# Patient Record
Sex: FEMALE | Race: WHITE | NOT HISPANIC OR LATINO | URBAN - METROPOLITAN AREA
[De-identification: names, ages, dates, MRNs, and addresses within clinical notes are randomized per-mention and may not be internally consistent; named-entity substitution may affect disease eponyms.]

---

## 2022-09-02 ENCOUNTER — EMERGENCY (EMERGENCY)
Facility: HOSPITAL | Age: 52
LOS: 1 days | Discharge: HOME | End: 2022-09-02
Attending: EMERGENCY MEDICINE | Admitting: EMERGENCY MEDICINE

## 2022-09-02 ENCOUNTER — RESULT REVIEW (OUTPATIENT)
Age: 52
End: 2022-09-02

## 2022-09-02 VITALS
DIASTOLIC BLOOD PRESSURE: 66 MMHG | TEMPERATURE: 98 F | SYSTOLIC BLOOD PRESSURE: 112 MMHG | HEART RATE: 94 BPM | RESPIRATION RATE: 18 BRPM | OXYGEN SATURATION: 99 %

## 2022-09-02 VITALS
SYSTOLIC BLOOD PRESSURE: 118 MMHG | OXYGEN SATURATION: 97 % | DIASTOLIC BLOOD PRESSURE: 76 MMHG | HEART RATE: 88 BPM | RESPIRATION RATE: 17 BRPM

## 2022-09-02 LAB
ALBUMIN SERPL ELPH-MCNC: 4.3 G/DL — SIGNIFICANT CHANGE UP (ref 3.3–5)
ALP SERPL-CCNC: 148 U/L — HIGH (ref 40–120)
ALT FLD-CCNC: 29 U/L — SIGNIFICANT CHANGE UP (ref 4–33)
ANION GAP SERPL CALC-SCNC: 21 MMOL/L — HIGH (ref 7–14)
AST SERPL-CCNC: 16 U/L — SIGNIFICANT CHANGE UP (ref 4–32)
BASE EXCESS BLDV CALC-SCNC: -8.7 MMOL/L — LOW (ref -2–3)
BASOPHILS # BLD AUTO: 0.01 K/UL — SIGNIFICANT CHANGE UP (ref 0–0.2)
BASOPHILS NFR BLD AUTO: 0.2 % — SIGNIFICANT CHANGE UP (ref 0–2)
BILIRUB SERPL-MCNC: 0.5 MG/DL — SIGNIFICANT CHANGE UP (ref 0.2–1.2)
BLOOD GAS VENOUS COMPREHENSIVE RESULT: SIGNIFICANT CHANGE UP
BUN SERPL-MCNC: 18 MG/DL — SIGNIFICANT CHANGE UP (ref 7–23)
C3 SERPL-MCNC: 162 MG/DL — SIGNIFICANT CHANGE UP (ref 90–180)
C4 SERPL-MCNC: 32 MG/DL — SIGNIFICANT CHANGE UP (ref 10–40)
CALCIUM SERPL-MCNC: 9.2 MG/DL — SIGNIFICANT CHANGE UP (ref 8.4–10.5)
CHLORIDE BLDV-SCNC: 98 MMOL/L — SIGNIFICANT CHANGE UP (ref 96–108)
CHLORIDE SERPL-SCNC: 94 MMOL/L — LOW (ref 98–107)
CO2 BLDV-SCNC: 19.1 MMOL/L — LOW (ref 22–26)
CO2 SERPL-SCNC: 15 MMOL/L — LOW (ref 22–31)
CREAT SERPL-MCNC: 0.69 MG/DL — SIGNIFICANT CHANGE UP (ref 0.5–1.3)
EGFR: 103 ML/MIN/1.73M2 — SIGNIFICANT CHANGE UP
EOSINOPHIL # BLD AUTO: 0.02 K/UL — SIGNIFICANT CHANGE UP (ref 0–0.5)
EOSINOPHIL NFR BLD AUTO: 0.3 % — SIGNIFICANT CHANGE UP (ref 0–6)
ERYTHROCYTE [SEDIMENTATION RATE] IN BLOOD: 29 MM/HR — HIGH (ref 4–25)
GAS PNL BLDV: 130 MMOL/L — LOW (ref 136–145)
GAS PNL BLDV: SIGNIFICANT CHANGE UP
GLUCOSE BLDV-MCNC: 399 MG/DL — HIGH (ref 70–99)
GLUCOSE SERPL-MCNC: 375 MG/DL — HIGH (ref 70–99)
HCO3 BLDV-SCNC: 18 MMOL/L — LOW (ref 22–29)
HCT VFR BLD CALC: 45.9 % — HIGH (ref 34.5–45)
HCT VFR BLDA CALC: 45 % — SIGNIFICANT CHANGE UP (ref 34.5–46.5)
HGB BLD CALC-MCNC: 14.9 G/DL — SIGNIFICANT CHANGE UP (ref 11.5–15.5)
HGB BLD-MCNC: 14.7 G/DL — SIGNIFICANT CHANGE UP (ref 11.5–15.5)
IANC: 5.01 K/UL — SIGNIFICANT CHANGE UP (ref 1.8–7.4)
IMM GRANULOCYTES NFR BLD AUTO: 0.3 % — SIGNIFICANT CHANGE UP (ref 0–1.5)
LACTATE BLDV-MCNC: 1.5 MMOL/L — SIGNIFICANT CHANGE UP (ref 0.5–2)
LYMPHOCYTES # BLD AUTO: 0.86 K/UL — LOW (ref 1–3.3)
LYMPHOCYTES # BLD AUTO: 13.4 % — SIGNIFICANT CHANGE UP (ref 13–44)
MCHC RBC-ENTMCNC: 28.5 PG — SIGNIFICANT CHANGE UP (ref 27–34)
MCHC RBC-ENTMCNC: 32 GM/DL — SIGNIFICANT CHANGE UP (ref 32–36)
MCV RBC AUTO: 89.1 FL — SIGNIFICANT CHANGE UP (ref 80–100)
MONOCYTES # BLD AUTO: 0.5 K/UL — SIGNIFICANT CHANGE UP (ref 0–0.9)
MONOCYTES NFR BLD AUTO: 7.8 % — SIGNIFICANT CHANGE UP (ref 2–14)
NEUTROPHILS # BLD AUTO: 5.01 K/UL — SIGNIFICANT CHANGE UP (ref 1.8–7.4)
NEUTROPHILS NFR BLD AUTO: 78 % — HIGH (ref 43–77)
NRBC # BLD: 0 /100 WBCS — SIGNIFICANT CHANGE UP (ref 0–0)
NRBC # FLD: 0 K/UL — SIGNIFICANT CHANGE UP (ref 0–0)
PCO2 BLDV: 40 MMHG — SIGNIFICANT CHANGE UP (ref 39–42)
PH BLDV: 7.26 — LOW (ref 7.32–7.43)
PLATELET # BLD AUTO: 473 K/UL — HIGH (ref 150–400)
PO2 BLDV: 27 MMHG — SIGNIFICANT CHANGE UP
POTASSIUM BLDV-SCNC: 3.9 MMOL/L — SIGNIFICANT CHANGE UP (ref 3.5–5.1)
POTASSIUM SERPL-MCNC: 4 MMOL/L — SIGNIFICANT CHANGE UP (ref 3.5–5.3)
POTASSIUM SERPL-SCNC: 4 MMOL/L — SIGNIFICANT CHANGE UP (ref 3.5–5.3)
PROT SERPL-MCNC: 7.5 G/DL — SIGNIFICANT CHANGE UP (ref 6–8.3)
PROT SERPL-MCNC: 7.7 G/DL — SIGNIFICANT CHANGE UP (ref 6–8.3)
RBC # BLD: 5.15 M/UL — SIGNIFICANT CHANGE UP (ref 3.8–5.2)
RBC # FLD: 12.5 % — SIGNIFICANT CHANGE UP (ref 10.3–14.5)
SAO2 % BLDV: 38.7 % — SIGNIFICANT CHANGE UP
SODIUM SERPL-SCNC: 130 MMOL/L — LOW (ref 135–145)
WBC # BLD: 6.42 K/UL — SIGNIFICANT CHANGE UP (ref 3.8–10.5)
WBC # FLD AUTO: 6.42 K/UL — SIGNIFICANT CHANGE UP (ref 3.8–10.5)

## 2022-09-02 PROCEDURE — 99283 EMERGENCY DEPT VISIT LOW MDM: CPT | Mod: 25,GC

## 2022-09-02 PROCEDURE — 88313 SPECIAL STAINS GROUP 2: CPT | Mod: 26

## 2022-09-02 PROCEDURE — 99285 EMERGENCY DEPT VISIT HI MDM: CPT

## 2022-09-02 PROCEDURE — 88350 IMFLUOR EA ADDL 1ANTB STN PX: CPT | Mod: 26

## 2022-09-02 PROCEDURE — 11104 PUNCH BX SKIN SINGLE LESION: CPT

## 2022-09-02 PROCEDURE — 88305 TISSUE EXAM BY PATHOLOGIST: CPT | Mod: 26

## 2022-09-02 PROCEDURE — 88346 IMFLUOR 1ST 1ANTB STAIN PX: CPT | Mod: 26

## 2022-09-02 PROCEDURE — 88312 SPECIAL STAINS GROUP 1: CPT | Mod: 26

## 2022-09-02 PROCEDURE — 11105 PUNCH BX SKIN EA SEP/ADDL: CPT

## 2022-09-02 PROCEDURE — 84165 PROTEIN E-PHORESIS SERUM: CPT | Mod: 26

## 2022-09-02 RX ORDER — EPINEPHRINE 0.3 MG/.3ML
0.3 INJECTION INTRAMUSCULAR; SUBCUTANEOUS ONCE
Refills: 0 | Status: COMPLETED | OUTPATIENT
Start: 2022-09-02 | End: 2022-09-02

## 2022-09-02 RX ORDER — CETIRIZINE HYDROCHLORIDE 10 MG/1
1 TABLET ORAL
Qty: 7 | Refills: 0
Start: 2022-09-02 | End: 2022-09-08

## 2022-09-02 RX ORDER — INSULIN LISPRO 100/ML
5 VIAL (ML) SUBCUTANEOUS ONCE
Refills: 0 | Status: COMPLETED | OUTPATIENT
Start: 2022-09-02 | End: 2022-09-02

## 2022-09-02 RX ORDER — HYDROXYZINE HCL 10 MG
1 TABLET ORAL
Qty: 14 | Refills: 0
Start: 2022-09-02 | End: 2022-09-15

## 2022-09-02 RX ORDER — DIPHENHYDRAMINE HCL 50 MG
50 CAPSULE ORAL ONCE
Refills: 0 | Status: COMPLETED | OUTPATIENT
Start: 2022-09-02 | End: 2022-09-02

## 2022-09-02 RX ORDER — SODIUM CHLORIDE 9 MG/ML
1000 INJECTION, SOLUTION INTRAVENOUS ONCE
Refills: 0 | Status: COMPLETED | OUTPATIENT
Start: 2022-09-02 | End: 2022-09-02

## 2022-09-02 RX ADMIN — Medication 50 MILLIGRAM(S): at 05:28

## 2022-09-02 RX ADMIN — EPINEPHRINE 0.3 MILLIGRAM(S): 0.3 INJECTION INTRAMUSCULAR; SUBCUTANEOUS at 05:32

## 2022-09-02 RX ADMIN — Medication 125 MILLIGRAM(S): at 05:44

## 2022-09-02 RX ADMIN — SODIUM CHLORIDE 1000 MILLILITER(S): 9 INJECTION, SOLUTION INTRAVENOUS at 06:56

## 2022-09-02 RX ADMIN — SODIUM CHLORIDE 1000 MILLILITER(S): 9 INJECTION, SOLUTION INTRAVENOUS at 05:53

## 2022-09-02 NOTE — ED ADULT NURSE REASSESSMENT NOTE - NS ED NURSE REASSESS COMMENT FT1
pt is a type 1 diabetic uses insulin pump. pt wants to continue using pump, refusing hospital insulin. Omnipod pump is located on left lower quadrant, site is clean dry and intact. Pts fingerstick is being followed before meals. will continue to monitor.

## 2022-09-02 NOTE — CONSULT NOTE ADULT - ASSESSMENT
1.   Differential favors acute urticaria, cannot r/o urticarial vasculitis    Dermatology Punch Biopsy Procedure Note    After risks and benefits of procedure including bleeding, infection and scar were reviewed (consents including photo consent reviewed, signed and in chart), allergies were reviewed and time out performed. Written consent given by the patient.    Area cleaned with rubbing alcohol and anesthetized with lidocaine and epinephrine.  2 3mm punch biopsies were performed to R lateral buttock, hemostasis achieved with dissolvable chromic gut sutures with pressure dressing placed.   Wound care reviewed with patient and team: Biopsy site should remain covered with pressure bandage for 24-48 hours. Then apply Vaseline to biopsy site daily and cover with bandage until healed.   1. 1 month of recurrent pruritic wheals, differential favors acute urticaria. Reviewed with patient that it is often difficult to determine triggers for urticaria. In addition, she unsure of the duration of her buttock and posterior leg lesions, therefore cannot r/o urticarial vasculitis  -punch biopsies performed for H&E and DIF. See procedure note below  -at this time recommend:   --continue with Allegra qam per allergist  --add Zyrtec 10mg daily, can increase to BID if not experiencing relief in 2-3 days  --can try hydroxyzine 25mg nightly instead of doxepin, as doxepin has interactions with her levothyroxine  --start clobetasol propionate 0.05% ointment BID to itchy areas  Avoid prolonged use (>2 weeks without breaks) to prevent unwanted side effects such as atrophy, striae and telangiectasias  --please check SPEP, UPEP, CH50, C1q, C1q antibodies, C3, C4, BRIAN, ESR, RF  -Recommend outpatient follow up at our clinic located at [address] [place] [address] [address], [place] ([phone]) in 2-3 weeks. Dermatology to coordinate appointment.      Dermatology Punch Biopsy Procedure Note    After risks and benefits of procedure including bleeding, infection and scar were reviewed (consents including photo consent reviewed, signed and in chart), allergies were reviewed and time out performed. Written consent given by the patient.    Area cleaned with rubbing alcohol and anesthetized with lidocaine and epinephrine.  2 3mm punch biopsies were performed to R lateral buttock, hemostasis achieved with dissolvable chromic gut sutures with pressure dressing placed.   Wound care reviewed with patient and team: Biopsy site should remain covered with pressure bandage for 24-48 hours. Then apply Vaseline to biopsy site daily and cover with bandage until healed.    The patient's chart was reviewed in addition to photos of the rash taken by the primary team with the permission of the patient.  Patient was seen at bedside and discussed remotely with the dermatology attending Dr. Iverson.  Recommendations were communicated with the primary team.  Please page [phone] for further related questions.    Jarvis Hathaway MD  Resident Physician, PGY2  [facility]  Pager: [phone]  Office: [phone]

## 2022-09-02 NOTE — ED ADULT NURSE REASSESSMENT NOTE - NS ED NURSE REASSESS COMMENT FT1
received report form night RN. pt is A+OX4, ambulatory at baseline. generalized rash noted, on B/L upper and lower extremities and on lower back. red, itchy, and irregular shaped. no s/s of resp distress noted. VSS, denies chest pain and SOB, RR even and unlabored. pending derm assessment. will continue to monitor.

## 2022-09-02 NOTE — ED ADULT TRIAGE NOTE - CHIEF COMPLAINT QUOTE
C/o hives/rash all over body for a month. Was seen at urgent care, saw dermatologist and an allergist, every provider said it's hives. Was prescribed prednisone, given steroid shot and currently taking doxepin and fexofenadine w/o relief. Today started having some SOB, throat itching and dizziness. PMH hypothyroidism, DM1

## 2022-09-02 NOTE — ED PROVIDER NOTE - CLINICAL SUMMARY MEDICAL DECISION MAKING FREE TEXT BOX
Pt w/ hives on entire body p/w throat tightness and SOB. Had episode of vomiting x2 yesterday. Concern for anaphylaxis. Plan for labs, will give benadryl, solumedrol and epi.

## 2022-09-02 NOTE — ED PROVIDER NOTE - NSFOLLOWUPINSTRUCTIONS_ED_ALL_ED_FT
Biopsy site should remain covered with pressure bandage for 24-48 hours. Then apply Vaseline to biopsy site daily and cover with bandage until healed.    Allergic Reaction    An allergic reaction is an abnormal reaction to a substance (allergen) by the body's defense system. Common allergens include medicines, food, insect bites or stings, and blood products. The body releases certain proteins into the blood that can cause a variety of symptoms such as an itchy rash, wheezing, swelling of the face/lips/tongue/throat, abdominal pain, nausea or vomiting. An allergic reaction is usually treated with medication. If your health care provider prescribed you an epinephrine injection device, make sure to keep it with you at all times.    SEEK IMMEDIATE MEDICAL CARE IF YOU HAVE ANY OF THE FOLLOWING SYMPTOMS: allergic reaction severe enough that required you to use epinephrine, tightness in your chest, swelling around your lips/tongue/throat, abdominal pain, vomiting or diarrhea, or lightheadedness/dizziness. These symptoms may represent a serious problem that is an emergency. Do not wait to see if the symptoms will go away. Use your auto-injector pen or anaphylaxis kit as you have been instructed. Call 911 and do not drive yourself to the hospital.

## 2022-09-02 NOTE — ED PROVIDER NOTE - OBJECTIVE STATEMENT
53yo F PMH of T1DM and hypothyroidism p/w worsening hives. Pt states that she has been having hives/rash all over body for a month. Was seen at urgent care, saw dermatologist and an allergist, every provider said it's hives. Was given steroid shot by dermatologist w/ no improvement. Pt saw allergist and was prescribed doxepin and fexofenadine 2 days ago. Had allergy testing done yesterday. Today she started having some SOB, throat itching and dizziness and felt like the room was closing in on her. States that the SOB has resolved, but still having throat tightness, pressure sensation when swallowing, headache, dizziness, and fully body itchiness. She had 2 episode of vomiting yesterday. Denies any chest pain, N/V, abdominal pain, blurry vision, SOB.

## 2022-09-02 NOTE — ED PROVIDER NOTE - ATTENDING CONTRIBUTION TO CARE
I performed a face-to-face evaluation of the patient and performed a history and physical examination along with the resident or ACP, and/or medical student above.  I agree with the history and physical examination as documented by the resident or ACP, and/or medical student above.  Mitchell:  51yo F w/ pmh as above p/w complicated whole body rash over a month, already seen at [facility] as well as by outpt dermatologist/allergist and told undifferentiated hives, but symptoms worsening despite meds, and today experienced sob and throat itching. Meds, labs, derm consult.

## 2022-09-02 NOTE — ED ADULT NURSE REASSESSMENT NOTE - NS ED NURSE REASSESS COMMENT FT1
Received call that patient has insulin pump. Diabetes educator Lulu Gagnon notified and will see patient.

## 2022-09-02 NOTE — CONSULT NOTE ADULT - ATTENDING COMMENTS
Agree with urticaria, unknown trigger. Unsure if the eruption on the flanks and buttocks has lesions that last >24hrs. Thus, H&E and DIF punch bx will be used to r/o urticarial vasculitis. Will follow up on recommended labs. Prefer to rx with antihistamines and topical steroids (avoid PO pred given h/o DM2), please avoid skin folds as clobetasol is a class 1 steroid. Thank you for allowing us to participate in the care of this patient.

## 2022-09-02 NOTE — ED PROVIDER NOTE - PATIENT PORTAL LINK FT
You can access the FollowMyHealth Patient Portal offered by [facility] by registering at the following website: [email or web address]. By joining [facility]’s FollowMyHealth portal, you will also be able to view your health information using other applications (apps) compatible with our system.
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
## 2022-09-02 NOTE — ED PROVIDER NOTE - PHYSICAL EXAMINATION
LOS:     VITALS:   T(C): 36.8 (09-02-22 @ 04:03), Max: 36.8 (09-02-22 @ 04:03)  HR: 94 (09-02-22 @ 04:03) (94 - 94)  BP: 112/66 (09-02-22 @ 04:03) (112/66 - 112/66)  RR: 18 (09-02-22 @ 04:03) (18 - 18)  SpO2: 99% (09-02-22 @ 04:03) (99% - 99%)    GENERAL: NAD, lying in bed comfortably  HEAD:  Atraumatic, Normocephalic  EYES: EOMI, PERRLA, conjunctiva and sclera clear  ENT: Moist mucous membranes  NECK: Supple, No JVD  CHEST/LUNG: Clear to auscultation bilaterally; No rales, rhonchi, wheezing, or rubs. Unlabored respirations  HEART: Regular rate and rhythm; No murmurs, rubs, or gallops  ABDOMEN: BSx4; Soft, nontender, nondistended  EXTREMITIES:  2+ Peripheral Pulses, brisk capillary refill. No clubbing, cyanosis, or edema  NERVOUS SYSTEM:  A&Ox3, no focal deficits   SKIN: Hives on entire body   Psych: Normal mood and affect

## 2022-09-02 NOTE — ED PROVIDER NOTE - NS ED ROS FT
REVIEW OF SYSTEMS:    CONSTITUTIONAL: No weakness, fevers or chills  EYES:  No visual changes, no eye pain   ENT: No vertigo. + throat tightness  NECK: No pain or stiffness  RESPIRATORY: No cough, wheezing, hemoptysis; No shortness of breath  CARDIOVASCULAR: No chest pain or palpitations  GASTROINTESTINAL: No abdominal or epigastric pain. No nausea, vomiting, or hematemesis; No diarrhea or constipation. No melena or hematochezia.  GENITOURINARY: No dysuria, frequency or hematuria  NEUROLOGICAL: No numbness or weakness  SKIN: + itching and hives  Psych: no anxiety or depression

## 2022-09-02 NOTE — ED PROVIDER NOTE - NSFOLLOWUPCLINICS_GEN_ALL_ED_FT
[facility] - [place]  Dermatology  [address], [address]  [place] [address]  Phone: [phone]  Fax: [phone]

## 2022-09-02 NOTE — ED ADULT NURSE NOTE - OBJECTIVE STATEMENT
Pt presents RM 6 AO4 ambulatory w steady gait complaining of full body hives. Pt endorses experiencing hives all over body for x1 month, has gone to dermatologist and allergist without any improvement. Presents now after awakening in night at ~0300 and feeling nauseous and having itchy throat and feeling lightheaded/dizzy. Currently denies any lightheadedness, sob, diff breathing, or any other complaints. 20g IV placed to R AC labs drawn and sent and medicated as per EMR. Resp even unlabored, speech clear and denies diff swallowing. Appears in no obv distress.

## 2022-09-02 NOTE — ED PROVIDER NOTE - PROGRESS NOTE DETAILS
Pt states that the SOB and throat closing has improved. Hives and itchiness remained the same. Dermatology consulted and will see patient in the afternoon. Pt also having elevated blood glucose of 375, will give additional 1L bolus and 5U admelog.

## 2022-09-03 LAB — RHEUMATOID FACT SERPL-ACNC: 12 IU/ML — SIGNIFICANT CHANGE UP (ref 0–13)

## 2022-09-06 LAB
% ALBUMIN: 45.3 % — SIGNIFICANT CHANGE UP
% ALPHA 1: 4.5 % — SIGNIFICANT CHANGE UP
% ALPHA 2: 18.4 % — SIGNIFICANT CHANGE UP
% BETA: 16 % — SIGNIFICANT CHANGE UP
% GAMMA: 15.9 % — SIGNIFICANT CHANGE UP
ALBUMIN SERPL ELPH-MCNC: 3.49 G/DL — SIGNIFICANT CHANGE UP (ref 3.3–4.4)
ALBUMIN/GLOB SERPL ELPH: 0.8 RATIO — SIGNIFICANT CHANGE UP
ALPHA1 GLOB SERPL ELPH-MCNC: 0.35 G/DL — HIGH (ref 0.1–0.3)
ALPHA2 GLOB SERPL ELPH-MCNC: 1.4 G/DL — HIGH (ref 0.6–1)
ANA TITR SER: NEGATIVE — SIGNIFICANT CHANGE UP
B-GLOBULIN SERPL ELPH-MCNC: 1.23 G/DL — HIGH (ref 0.6–1.1)
GAMMA GLOBULIN: 1.22 G/DL — SIGNIFICANT CHANGE UP (ref 0.7–1.7)
IC SERPL C1Q BIND-ACNC: <1.2 UG EQ/ML — SIGNIFICANT CHANGE UP
PROT PATTERN SERPL ELPH-IMP: SIGNIFICANT CHANGE UP
PROT SERPL-MCNC: 7.7 G/DL — SIGNIFICANT CHANGE UP
TOTAL HEM COMP BLD-ACNC: 84 U/ML — SIGNIFICANT CHANGE UP (ref 42–95)

## 2022-09-06 RX ORDER — EPINEPHRINE 0.3 MG/.3ML
0.3 INJECTION INTRAMUSCULAR; SUBCUTANEOUS
Qty: 2 | Refills: 0
Start: 2022-09-06 | End: 2022-09-07

## 2022-09-06 NOTE — ED POST DISCHARGE NOTE - RESULT SUMMARY
Patient seen here for allergic reaction on 9/2, was told she would get sent epipens to pharmacy, no scrip received. Sent epipens x2 to [facility] pharmacy. Patient aware. Also gave follow-up contact information for Dermatology.

## 2022-09-13 LAB — SURGICAL PATHOLOGY STUDY: SIGNIFICANT CHANGE UP

## 2022-09-14 LAB — C1Q AG SERPL RAJI CELL-ACNC: 15.8 MG/DL — SIGNIFICANT CHANGE UP (ref 10.3–20.5)

## 2022-09-15 NOTE — POST DISCHARGE NOTE - NOTIFICATION:
spoke with patient regarding biopsy results, favoring neutrophilic urticaria- will discuss further tx at f/u. Patient says today is first day she has noticed fewer lesions. Starting to experience some GERD.  Patient agreeable to f/u next week

## 2022-09-19 PROBLEM — Z00.00 ENCOUNTER FOR PREVENTIVE HEALTH EXAMINATION: Status: ACTIVE | Noted: 2022-09-19

## 2022-09-19 PROBLEM — E10.9 TYPE 1 DIABETES MELLITUS WITHOUT COMPLICATIONS: Chronic | Status: ACTIVE | Noted: 2022-09-02

## 2022-09-19 PROBLEM — E03.9 HYPOTHYROIDISM, UNSPECIFIED: Chronic | Status: ACTIVE | Noted: 2022-09-02

## 2022-09-23 ENCOUNTER — APPOINTMENT (OUTPATIENT)
Dept: DERMATOLOGY | Facility: CLINIC | Age: 52
End: 2022-09-23

## 2022-09-30 ENCOUNTER — APPOINTMENT (OUTPATIENT)
Dept: DERMATOLOGY | Facility: CLINIC | Age: 52
End: 2022-09-30

## 2022-09-30 DIAGNOSIS — L50.8 OTHER URTICARIA: ICD-10-CM

## 2022-09-30 PROCEDURE — 99214 OFFICE O/P EST MOD 30 MIN: CPT

## 2022-09-30 RX ORDER — MOMETASONE FUROATE 1 MG/G
0.1 OINTMENT TOPICAL
Qty: 1 | Refills: 1 | Status: ACTIVE | COMMUNITY
Start: 2022-09-30 | End: 1900-01-01

## 2022-09-30 RX ORDER — DOXEPIN HYDROCHLORIDE 25 MG/1
25 CAPSULE ORAL
Qty: 60 | Refills: 1 | Status: ACTIVE | COMMUNITY
Start: 2022-09-30 | End: 1900-01-01

## 2022-09-30 RX ORDER — CLOBETASOL PROPIONATE 0.5 MG/G
0.05 OINTMENT TOPICAL
Qty: 1 | Refills: 0 | Status: ACTIVE | COMMUNITY
Start: 2022-09-30 | End: 1900-01-01

## 2022-10-26 ENCOUNTER — NON-APPOINTMENT (OUTPATIENT)
Age: 52
End: 2022-10-26

## 2022-11-07 ENCOUNTER — NON-APPOINTMENT (OUTPATIENT)
Age: 52
End: 2022-11-07

## 2022-11-14 ENCOUNTER — NON-APPOINTMENT (OUTPATIENT)
Age: 52
End: 2022-11-14

## 2023-09-26 ENCOUNTER — OFFICE (OUTPATIENT)
Dept: URBAN - METROPOLITAN AREA CLINIC 88 | Facility: CLINIC | Age: 53
Setting detail: OPHTHALMOLOGY
End: 2023-09-26
Payer: COMMERCIAL

## 2023-09-26 VITALS — HEIGHT: 55 IN

## 2023-09-26 DIAGNOSIS — H35.363: ICD-10-CM

## 2023-09-26 DIAGNOSIS — H35.373: ICD-10-CM

## 2023-09-26 DIAGNOSIS — E10.9: ICD-10-CM

## 2023-09-26 DIAGNOSIS — H25.13: ICD-10-CM

## 2023-09-26 DIAGNOSIS — H15.122: ICD-10-CM

## 2023-09-26 DIAGNOSIS — H43.811: ICD-10-CM

## 2023-09-26 DIAGNOSIS — H43.393: ICD-10-CM

## 2023-09-26 PROCEDURE — 92250 FUNDUS PHOTOGRAPHY W/I&R: CPT | Performed by: OPTOMETRIST

## 2023-09-26 PROCEDURE — 92014 COMPRE OPH EXAM EST PT 1/>: CPT | Performed by: OPTOMETRIST

## 2023-09-26 ASSESSMENT — AXIALLENGTH_DERIVED
OD_AL: 25.74
OD_AL: 25.5102
OS_AL: 25.4566
OS_AL: 25.6279
OD_AL: 25.6246
OS_AL: 25.6855

## 2023-09-26 ASSESSMENT — KERATOMETRY
OS_K2POWER_DIOPTERS: 42.75
OS_K1POWER_DIOPTERS: 42.00
OD_K1POWER_DIOPTERS: 42.00
OD_K2POWER_DIOPTERS: 42.50
OS_AXISANGLE_DEGREES: 067
OD_AXISANGLE_DEGREES: 089

## 2023-09-26 ASSESSMENT — REFRACTION_CURRENTRX
OS_AXIS: 166
OS_VPRISM_DIRECTION: SV
OS_SPHERE: -3.50
OD_VPRISM_DIRECTION: SV
OS_OVR_VA: 20/
OS_CYLINDER: -0.50
OD_OVR_VA: 20/
OD_AXIS: 077
OD_SPHERE: -3.75
OD_CYLINDER: -1.25

## 2023-09-26 ASSESSMENT — SPHEQUIV_DERIVED
OS_SPHEQUIV: -3.375
OS_SPHEQUIV: -3.75
OD_SPHEQUIV: -3.625
OS_SPHEQUIV: -3.875
OD_SPHEQUIV: -3.375
OD_SPHEQUIV: -3.875

## 2023-09-26 ASSESSMENT — REFRACTION_MANIFEST
OS_CYLINDER: -0.50
OD_CYLINDER: -0.25
OD_AXIS: 040
OD_SPHERE: -3.75
OD_SPHERE: -3.50
OS_AXIS: 150
OS_VA1: 20/25
OS_VA1: 20/20+
OS_SPHERE: -3.50
OD_VA1: 20/20
OD_CYLINDER: -0.25
OS_SPHERE: -3.50
OD_VA1: 20/25
OD_AXIS: 075
OS_CYLINDER: -0.75
OS_AXIS: 165

## 2023-09-26 ASSESSMENT — REFRACTION_AUTOREFRACTION
OD_AXIS: 027
OD_CYLINDER: -0.25
OS_CYLINDER: -0.75
OD_SPHERE: -3.25
OS_SPHERE: -3.00
OS_AXIS: 144

## 2023-09-26 ASSESSMENT — TONOMETRY
OS_IOP_MMHG: 14
OD_IOP_MMHG: 11

## 2023-09-26 ASSESSMENT — VISUAL ACUITY
OD_BCVA: 20/20-2
OS_BCVA: 20/25

## 2023-09-26 ASSESSMENT — CONFRONTATIONAL VISUAL FIELD TEST (CVF)
OD_FINDINGS: FULL
OS_FINDINGS: FULL

## 2024-03-12 ENCOUNTER — RX ONLY (RX ONLY)
Age: 54
End: 2024-03-12

## 2024-03-12 ENCOUNTER — OFFICE (OUTPATIENT)
Dept: URBAN - METROPOLITAN AREA CLINIC 103 | Facility: CLINIC | Age: 54
Setting detail: OPHTHALMOLOGY
End: 2024-03-12
Payer: COMMERCIAL

## 2024-03-12 DIAGNOSIS — H16.042: ICD-10-CM

## 2024-03-12 PROCEDURE — 99213 OFFICE O/P EST LOW 20 MIN: CPT | Performed by: OPTOMETRIST

## 2024-03-12 ASSESSMENT — REFRACTION_MANIFEST
OS_SPHERE: -3.50
OD_SPHERE: -3.50
OS_AXIS: 150
OS_CYLINDER: -0.50
OD_VA1: 20/20
OD_SPHERE: -3.75
OS_CYLINDER: -0.75
OS_AXIS: 165
OD_VA1: 20/25
OS_VA1: 20/20+
OD_AXIS: 040
OS_SPHERE: -3.50
OD_CYLINDER: -0.25
OD_AXIS: 075
OD_CYLINDER: -0.25
OS_VA1: 20/25

## 2024-03-12 ASSESSMENT — REFRACTION_CURRENTRX
OS_OVR_VA: 20/
OD_OVR_VA: 20/
OD_VPRISM_DIRECTION: SV
OD_CYLINDER: -1.25
OS_VPRISM_DIRECTION: SV
OS_AXIS: 166
OD_AXIS: 077
OD_SPHERE: -3.75
OS_CYLINDER: -0.50
OS_SPHERE: -3.50

## 2024-03-12 ASSESSMENT — SPHEQUIV_DERIVED
OS_SPHEQUIV: -3.875
OD_SPHEQUIV: -3.625
OS_SPHEQUIV: -3.75
OD_SPHEQUIV: -3.875

## 2024-03-20 ENCOUNTER — OFFICE (OUTPATIENT)
Dept: URBAN - METROPOLITAN AREA CLINIC 115 | Facility: CLINIC | Age: 54
Setting detail: OPHTHALMOLOGY
End: 2024-03-20
Payer: COMMERCIAL

## 2024-03-20 DIAGNOSIS — H16.042: ICD-10-CM

## 2024-03-20 DIAGNOSIS — H16.223: ICD-10-CM

## 2024-03-20 PROCEDURE — 92012 INTRM OPH EXAM EST PATIENT: CPT | Performed by: OPTOMETRIST

## 2024-03-20 ASSESSMENT — REFRACTION_MANIFEST
OS_CYLINDER: -0.50
OD_AXIS: 040
OS_SPHERE: -3.50
OS_VA1: 20/20+
OD_VA1: 20/20
OD_CYLINDER: -0.25
OD_SPHERE: -3.50
OS_AXIS: 165
OS_CYLINDER: -0.75
OD_SPHERE: -3.75
OS_VA1: 20/25
OD_AXIS: 075
OS_SPHERE: -3.50
OD_VA1: 20/25
OD_CYLINDER: -0.25
OS_AXIS: 150

## 2024-03-20 ASSESSMENT — REFRACTION_CURRENTRX
OD_VPRISM_DIRECTION: SV
OS_AXIS: 166
OS_VPRISM_DIRECTION: SV
OD_OVR_VA: 20/
OD_CYLINDER: -1.25
OD_SPHERE: -3.75
OS_OVR_VA: 20/
OS_CYLINDER: -0.50
OD_AXIS: 077
OS_SPHERE: -3.50

## 2024-03-20 ASSESSMENT — SPHEQUIV_DERIVED
OD_SPHEQUIV: -3.875
OS_SPHEQUIV: -3.75
OS_SPHEQUIV: -3.875
OD_SPHEQUIV: -3.625

## 2024-03-26 ENCOUNTER — OFFICE (OUTPATIENT)
Dept: URBAN - METROPOLITAN AREA CLINIC 103 | Facility: CLINIC | Age: 54
Setting detail: OPHTHALMOLOGY
End: 2024-03-26
Payer: COMMERCIAL

## 2024-03-26 DIAGNOSIS — H25.13: ICD-10-CM

## 2024-03-26 DIAGNOSIS — H35.373: ICD-10-CM

## 2024-03-26 DIAGNOSIS — H52.13: ICD-10-CM

## 2024-03-26 DIAGNOSIS — H35.363: ICD-10-CM

## 2024-03-26 DIAGNOSIS — E10.9: ICD-10-CM

## 2024-03-26 DIAGNOSIS — H43.393: ICD-10-CM

## 2024-03-26 DIAGNOSIS — H16.223: ICD-10-CM

## 2024-03-26 DIAGNOSIS — H43.811: ICD-10-CM

## 2024-03-26 PROCEDURE — 92014 COMPRE OPH EXAM EST PT 1/>: CPT | Performed by: OPTOMETRIST

## 2024-03-26 PROCEDURE — 92015 DETERMINE REFRACTIVE STATE: CPT | Performed by: OPTOMETRIST

## 2024-03-26 PROCEDURE — 92250 FUNDUS PHOTOGRAPHY W/I&R: CPT | Performed by: OPTOMETRIST

## 2024-03-26 ASSESSMENT — REFRACTION_MANIFEST
OS_SPHERE: -2.75
OS_CYLINDER: -0.50
OS_AXIS: 165
OD_CYLINDER: -0.25
OS_VA1: 20/20
OD_AXIS: 040
OD_VA1: 20/20
OD_SPHERE: -3.75
OD_VA1: 20/20
OS_AXIS: 150
OS_VA1: 20/25
OD_CYLINDER: -0.25
OD_AXIS: 075
OS_CYLINDER: -0.75
OD_CYLINDER: 0.00
OD_AXIS: 000
OS_SPHERE: -3.50
OD_VA1: 20/25
OS_AXIS: 139
OD_SPHERE: -4.25
OS_VA1: 20/20+
OS_SPHERE: -3.50
OS_CYLINDER: -0.50
OD_SPHERE: -3.50

## 2024-03-26 ASSESSMENT — REFRACTION_CURRENTRX
OD_SPHERE: -3.75
OD_AXIS: 077
OD_CYLINDER: -1.25
OS_CYLINDER: -0.50
OS_AXIS: 166
OD_OVR_VA: 20/
OS_SPHERE: -3.50
OS_OVR_VA: 20/
OD_VPRISM_DIRECTION: SV
OS_VPRISM_DIRECTION: SV

## 2024-03-26 ASSESSMENT — SPHEQUIV_DERIVED
OD_SPHEQUIV: -4.25
OD_SPHEQUIV: -3.875
OD_SPHEQUIV: -3.625
OS_SPHEQUIV: -3.75
OS_SPHEQUIV: -3.875
OS_SPHEQUIV: -3

## 2024-10-01 ENCOUNTER — OFFICE (OUTPATIENT)
Dept: URBAN - METROPOLITAN AREA CLINIC 103 | Facility: CLINIC | Age: 54
Setting detail: OPHTHALMOLOGY
End: 2024-10-01
Payer: COMMERCIAL

## 2024-10-01 DIAGNOSIS — H16.223: ICD-10-CM

## 2024-10-01 DIAGNOSIS — H35.363: ICD-10-CM

## 2024-10-01 DIAGNOSIS — H35.373: ICD-10-CM

## 2024-10-01 DIAGNOSIS — H43.811: ICD-10-CM

## 2024-10-01 PROBLEM — H35.40 PERIPAPILLARY ATROPHY: Status: ACTIVE | Noted: 2024-10-01

## 2024-10-01 PROCEDURE — 92250 FUNDUS PHOTOGRAPHY W/I&R: CPT | Performed by: OPTOMETRIST

## 2024-10-01 PROCEDURE — 92014 COMPRE OPH EXAM EST PT 1/>: CPT | Performed by: OPTOMETRIST

## 2024-10-01 ASSESSMENT — REFRACTION_MANIFEST
OS_AXIS: 139
OD_CYLINDER: -0.25
OS_AXIS: 165
OD_SPHERE: -3.75
OS_SPHERE: -2.75
OD_VA1: 20/25
OS_VA1: 20/20
OD_AXIS: 000
OD_SPHERE: -3.50
OD_AXIS: 075
OS_VA1: 20/25
OS_SPHERE: -3.50
OS_CYLINDER: -0.75
OD_CYLINDER: -0.25
OS_AXIS: 150
OS_CYLINDER: -0.50
OS_SPHERE: -3.50
OD_SPHERE: -4.25
OD_VA1: 20/20
OD_AXIS: 040
OS_VA1: 20/20+
OD_CYLINDER: 0.00
OS_CYLINDER: -0.50
OD_VA1: 20/20

## 2024-10-01 ASSESSMENT — REFRACTION_CURRENTRX
OS_SPHERE: -3.50
OD_VPRISM_DIRECTION: SV
OS_VPRISM_DIRECTION: SV
OS_CYLINDER: -0.50
OS_AXIS: 166
OD_SPHERE: -3.75
OS_OVR_VA: 20/
OD_OVR_VA: 20/
OD_CYLINDER: -1.25
OD_AXIS: 077

## 2024-10-01 ASSESSMENT — REFRACTION_AUTOREFRACTION
OS_CYLINDER: -0.50
OS_SPHERE: -2.75
OD_CYLINDER: SPHERE
OD_SPHERE: -3.50
OS_AXIS: 139

## 2024-10-01 ASSESSMENT — TONOMETRY
OD_IOP_MMHG: 10
OS_IOP_MMHG: 10

## 2024-10-01 ASSESSMENT — CONFRONTATIONAL VISUAL FIELD TEST (CVF)
OD_FINDINGS: FULL
OS_FINDINGS: FULL

## 2024-10-01 ASSESSMENT — KERATOMETRY
OS_AXISANGLE_DEGREES: 077
OS_K1POWER_DIOPTERS: 41.75
OS_K2POWER_DIOPTERS: 42.50
OD_K1POWER_DIOPTERS: 42.25
OD_K2POWER_DIOPTERS: 42.75
OD_AXISANGLE_DEGREES: 090

## 2024-10-01 ASSESSMENT — VISUAL ACUITY
OD_BCVA: 20/30-2
OS_BCVA: 20/70-1

## 2024-10-01 ASSESSMENT — SUPERFICIAL PUNCTATE KERATITIS (SPK)
OD_SPK: T
OS_SPK: T

## 2025-01-03 NOTE — ED PROVIDER NOTE - NSCAREINITIATED _GEN_ER
Quirino Mitchell(Attending) This was a shared visit with the BERNARD. I reviewed and verified the documentation.

## 2025-04-08 ENCOUNTER — OFFICE (OUTPATIENT)
Dept: URBAN - METROPOLITAN AREA CLINIC 103 | Facility: CLINIC | Age: 55
Setting detail: OPHTHALMOLOGY
End: 2025-04-08
Payer: COMMERCIAL

## 2025-04-08 DIAGNOSIS — H35.363: ICD-10-CM

## 2025-04-08 DIAGNOSIS — H52.13: ICD-10-CM

## 2025-04-08 DIAGNOSIS — H25.13: ICD-10-CM

## 2025-04-08 DIAGNOSIS — H35.373: ICD-10-CM

## 2025-04-08 DIAGNOSIS — H43.811: ICD-10-CM

## 2025-04-08 PROBLEM — H02.834 DERMATOCHALASIS; RIGHT UPPER LID, LEFT UPPER LID: Status: ACTIVE | Noted: 2025-04-08

## 2025-04-08 PROBLEM — H02.831 DERMATOCHALASIS; RIGHT UPPER LID, LEFT UPPER LID: Status: ACTIVE | Noted: 2025-04-08

## 2025-04-08 PROCEDURE — 92134 CPTRZ OPH DX IMG PST SGM RTA: CPT | Performed by: OPTOMETRIST

## 2025-04-08 PROCEDURE — 92014 COMPRE OPH EXAM EST PT 1/>: CPT | Performed by: OPTOMETRIST

## 2025-04-08 PROCEDURE — 92015 DETERMINE REFRACTIVE STATE: CPT | Performed by: OPTOMETRIST

## 2025-04-08 ASSESSMENT — REFRACTION_MANIFEST
OD_AXIS: 000
OS_SPHERE: -2.75
OS_SPHERE: -3.50
OS_AXIS: 149
OD_AXIS: 040
OD_SPHERE: -3.50
OS_SPHERE: -2.75
OD_VA1: 20/25
OD_VA1: 20/20
OS_AXIS: 139
OS_SPHERE: -3.50
OS_VA1: 20/25
OS_CYLINDER: -0.50
OD_SPHERE: -3.75
OS_AXIS: 150
OS_CYLINDER: -0.50
OS_AXIS: 165
OS_CYLINDER: -0.50
OD_VA1: 20/20
OD_AXIS: 000
OD_CYLINDER: -0.25
OD_SPHERE: -4.25
OD_CYLINDER: 0.00
OS_VA1: 20/20
OD_CYLINDER: -0.25
OD_SPHERE: -3.50
OD_VA1: 20/20
OD_AXIS: 075
OS_CYLINDER: -0.75
OS_VA1: 20/20
OS_VA1: 20/20+
OD_CYLINDER: 0.00

## 2025-04-08 ASSESSMENT — SUPERFICIAL PUNCTATE KERATITIS (SPK)
OS_SPK: T
OD_SPK: T

## 2025-04-08 ASSESSMENT — REFRACTION_AUTOREFRACTION
OD_AXIS: 098
OS_CYLINDER: -0.50
OS_AXIS: 149
OD_SPHERE: -3.50
OS_SPHERE: -2.75
OD_CYLINDER: -0.25

## 2025-04-08 ASSESSMENT — TONOMETRY
OS_IOP_MMHG: 10
OD_IOP_MMHG: 10

## 2025-04-08 ASSESSMENT — KERATOMETRY
OD_K2POWER_DIOPTERS: 42.50
OD_K1POWER_DIOPTERS: 42.00
OD_AXISANGLE_DEGREES: 073
OS_K2POWER_DIOPTERS: 42.50
OS_AXISANGLE_DEGREES: 067
OS_K1POWER_DIOPTERS: 42.25

## 2025-04-08 ASSESSMENT — REFRACTION_CURRENTRX
OD_CYLINDER: -1.25
OD_OVR_VA: 20/
OD_AXIS: 077
OS_AXIS: 166
OD_VPRISM_DIRECTION: SV
OD_SPHERE: -3.75
OS_CYLINDER: -0.50
OS_SPHERE: -3.50
OS_VPRISM_DIRECTION: SV
OS_OVR_VA: 20/

## 2025-04-08 ASSESSMENT — VISUAL ACUITY
OD_BCVA: 20/40
OS_BCVA: 20/20-1

## 2025-04-08 ASSESSMENT — LID POSITION - DERMATOCHALASIS
OS_DERMATOCHALASIS: LUL
OD_DERMATOCHALASIS: RUL